# Patient Record
Sex: MALE | Race: BLACK OR AFRICAN AMERICAN | URBAN - METROPOLITAN AREA
[De-identification: names, ages, dates, MRNs, and addresses within clinical notes are randomized per-mention and may not be internally consistent; named-entity substitution may affect disease eponyms.]

---

## 2018-03-11 ENCOUNTER — HOSPITAL ENCOUNTER (EMERGENCY)
Facility: CLINIC | Age: 38
Discharge: HOME | End: 2018-03-11
Attending: PHYSICIAN ASSISTANT | Admitting: PHYSICIAN ASSISTANT

## 2018-03-11 VITALS
WEIGHT: 242 LBS | SYSTOLIC BLOOD PRESSURE: 147 MMHG | DIASTOLIC BLOOD PRESSURE: 86 MMHG | TEMPERATURE: 98.8 F | RESPIRATION RATE: 14 BRPM | OXYGEN SATURATION: 99 % | HEART RATE: 74 BPM

## 2018-03-11 DIAGNOSIS — K04.7 DENTAL INFECTION: ICD-10-CM

## 2018-03-11 DIAGNOSIS — R22.0 LEFT FACIAL SWELLING: ICD-10-CM

## 2018-03-11 DIAGNOSIS — R25.2 TRISMUS: ICD-10-CM

## 2018-03-11 DIAGNOSIS — R51.9 FACIAL PAIN, ACUTE: ICD-10-CM

## 2018-03-11 PROCEDURE — 99213 OFFICE O/P EST LOW 20 MIN: CPT | Performed by: PHYSICIAN ASSISTANT

## 2018-03-11 PROCEDURE — G0463 HOSPITAL OUTPT CLINIC VISIT: HCPCS

## 2018-03-11 RX ORDER — CLINDAMYCIN HCL 300 MG
300 CAPSULE ORAL 3 TIMES DAILY
Qty: 30 CAPSULE | Refills: 0 | Status: SHIPPED | OUTPATIENT
Start: 2018-03-11 | End: 2018-03-14

## 2018-03-11 NOTE — ED AVS SNAPSHOT
[facility] Emergency Department    [address] [address]    Phone:  [phone]    Fax:  [phone]                                       Erick Murillo   MRN: [id number]    Department:  [facility] Emergency Department   Date of Visit:  3/11/2018           Patient Information     Date Of Birth          1980        Your diagnoses for this visit were:     Dental infection     Left facial swelling     Facial pain, acute     Trismus        You were seen by Kelly Perez PA-C.      Follow-up Information     Follow up with [facility] Emergency Department.    Specialty:  EMERGENCY MEDICINE    Why:  As needed, If symptoms worsen    Contact information:    [address] [address] [phone]    Additional information:    The medical center is located at   [address]. (between [address] and   [address] in [place], [place] miles north   of [place]).        Discharge Instructions       Salt water gargles, warm compress to face.     Use medication as directed  See dentist in next 1-2 days for further evaluation and treatment.     Tylenol/ibuprofen over the counter as needed for pain.  Return to Emergency Room if fevers, change in voice, difficulty swallowing, shortness of breath, or worsening symptoms occur.     24 Hour Appointment Hotline       To make an appointment at any [facility] clinic, call [phone] ([phone]). If you don't have a family doctor or clinic, we will help you find one. [facility] clinics are conveniently located to serve the needs of you and your family.             Review of your medicines      START taking        Dose / Directions Last dose taken    clindamycin 300 MG capsule   Commonly known as:  CLEOCIN   Dose:  300 mg   Quantity:  30 capsule        Take 1 capsule (300 mg) by mouth 3 times daily for 10 days   Refills:  0                Prescriptions were sent or printed at these locations (1 Prescription)          "          [facility] [place], [place] - [address]   [address] [address]    Telephone:  [phone]   Fax:  [phone]   Hours:                  E-Prescribed (1 of 1)         clindamycin (CLEOCIN) 300 MG capsule                Orders Needing Specimen Collection     None      Pending Results     No orders found from 3/9/2018 to 3/12/2018.            Pending Culture Results     No orders found from 3/9/2018 to 3/12/2018.            Pending Results Instructions     If you had any lab results that were not finalized at the time of your Discharge, you can call the ED Lab Result RN at [phone]. You will be contacted by this team for any positive Lab results or changes in treatment. The nurses are available 7 days a week from 10A to 6:30P.  You can leave a message 24 hours per day and they will return your call.        Test Results From Your Hospital Stay               Thank you for choosing [facility]       Thank you for choosing [facility] for your care. Our goal is always to provide you with excellent care. Hearing back from our patients is one way we can continue to improve our services. Please take a few minutes to complete the written survey that you may receive in the mail after you visit with us. Thank you!        [facility] Information     [employer] lets you send messages to your doctor, view your test results, renew your prescriptions, schedule appointments and more. To sign up, go to [email or web address] . Click on \"Log in\" on the left side of the screen, which will take you to the Welcome page. Then click on \"Sign up Now\" on the right side of the page.     You will be asked to enter the access code listed below, as well as some personal information. Please follow the directions to create your username and password.     Your access code is: [email or web address]  Expires: 2018  8:22 PM     Your access code will  in 90 days. If you need help or a new code, please call your " [facility] or [phone].        Care EveryWhere ID     This is your Care EveryWhere ID. This could be used by other organizations to access your [facility] medical records  [id number]        Equal Access to Services     SHILOH RAMOS : Katt Betts, wajanida luqadaha, qaybta kaalmada rubens, yuliya tee. So [facility] [phone].    ATENCIÓN: Si habla español, tiene a sarah disposición servicios gratuitos de asistencia lingüística. Llame al [phone].    We comply with applicable federal civil rights laws and [place] laws. We do not discriminate on the basis of race, color, national origin, age, disability, sex, sexual orientation, or gender identity.            After Visit Summary       This is your record. Keep this with you and show to your community pharmacist(s) and doctor(s) at your next visit.

## 2018-03-11 NOTE — ED AVS SNAPSHOT
[facility] Emergency Department    [address] [address]    Phone:  [phone]    Fax:  [phone]                                       Erick Murillo   MRN: [id number]    Department:  [facility] Emergency Department   Date of Visit:  3/11/2018           After Visit Summary Signature Page     I have received my discharge instructions, and my questions have been answered. I have discussed any challenges I see with this plan with the nurse or doctor.    ..........................................................................................................................................  Patient/Patient Representative Signature      ..........................................................................................................................................  Patient Representative Print Name and Relationship to Patient    ..................................................               ................................................  Date                                            Time    ..........................................................................................................................................  Reviewed by Signature/Title    ...................................................              ..............................................  Date                                                            Time

## 2018-03-12 ASSESSMENT — ENCOUNTER SYMPTOMS
NAUSEA: 0
FACIAL SWELLING: 1
NECK PAIN: 0
CHILLS: 0
APPETITE CHANGE: 1
VOMITING: 0
DIARRHEA: 0
FEVER: 0
DIZZINESS: 0
TROUBLE SWALLOWING: 0
VOICE CHANGE: 0
SORE THROAT: 0
HEADACHES: 0
FATIGUE: 0
NECK STIFFNESS: 0

## 2018-03-12 NOTE — ED NOTES
Patient here for infected/abcessed tooth on the L lower side, symptoms started 4 days ago.  Patient presents ambulatory to the urgent care.

## 2018-03-12 NOTE — ED PROVIDER NOTES
History     Chief Complaint   Patient presents with     Dental Pain     abscessed tooth left lower      HPI  Erick Murillo is a 37 year old male who presents to the urgent care with dental pain and difficulty opening mouth that started 4 days ago. Patient states he has appointment with dentist on Tuesday 3/13/18. Patient denies fevers, chills, sore throat, dysphonia, dysphagia, swollen throat feeling. Patient denies headaches, sinus pain, no erythema to the face, chest pain, shortness of breath, nausea/vomiting. Patient states he has bad taste in mouth, left lower jaw pain, dental pain and gum swelling. Patient has taken advil over the counter for the pain with minimal relief.     Problem List:    There are no active problems to display for this patient.       Past Medical History:    History reviewed. No pertinent past medical history.    Past Surgical History:    History reviewed. No pertinent surgical history.    Family History:    No family history on file.    Social History:  Marital Status:  Single [1]  Social History   Substance Use Topics     Smoking status: Current Every Day Smoker     Packs/day: 0.50     Smokeless tobacco: Never Used     Alcohol use Not on file        Medications:      clindamycin (CLEOCIN) 300 MG capsule         Review of Systems   Constitutional: Positive for appetite change. Negative for chills, fatigue and fever.   HENT: Positive for dental problem and facial swelling. Negative for congestion, drooling, ear pain, hearing loss, sore throat, trouble swallowing and voice change.    Gastrointestinal: Negative for diarrhea, nausea and vomiting.   Musculoskeletal: Negative for neck pain and neck stiffness.   Skin: Negative for rash.   Neurological: Negative for dizziness and headaches.   All other systems reviewed and are negative.      Physical Exam   BP: 147/86  Pulse: 74  Temp: 98.8  F (37.1  C)  Resp: 14  Weight: 109.8 kg (242 lb)  SpO2: 99 %      Physical Exam   Constitutional: He is  oriented to person, place, and time. He appears well-developed and well-nourished. No distress.   HENT:   Head:       Right Ear: Tympanic membrane, external ear and ear canal normal.   Left Ear: Tympanic membrane, external ear and ear canal normal.   Nose: Nose normal. Right sinus exhibits no maxillary sinus tenderness and no frontal sinus tenderness. Left sinus exhibits no maxillary sinus tenderness and no frontal sinus tenderness.   Mouth/Throat: Uvula is midline. He does not have dentures. No oral lesions. There is trismus in the jaw. Dental caries present. No uvula swelling or lacerations.   Positive moderate swelling the tenderness to the left lower jaw. No erythema or warmth. Patient able to bite down, but can not open mouth completely due to pain and swelling in jaw. Patient has poor dentition throughout mouth and the oral mucosa of the left cheek appears slightly erythematous and macerated near the molars. No significant abscess or gum swelling noted to the gums of the teeth, but positive lower dental pain with palpation.    Eyes: Conjunctivae and EOM are normal. Pupils are equal, round, and reactive to light.   Neck: Normal range of motion. No tracheal deviation present.   Cardiovascular: Normal rate, regular rhythm and normal heart sounds.    Pulmonary/Chest: Effort normal and breath sounds normal. No stridor.   Lymphadenopathy:     He has cervical adenopathy.   Neurological: He is alert and oriented to person, place, and time.   Skin: Skin is warm and dry. No rash noted. He is not diaphoretic. No erythema.   Psychiatric: He has a normal mood and affect. His behavior is normal. Judgment and thought content normal.       ED Course     ED Course     Procedures              Critical Care time:  none               No results found for this or any previous visit (from the past 24 hour(s)).    Assessments & Plan (with Medical Decision Making)     I have reviewed the nursing notes.    I have reviewed the  findings, diagnosis, plan and need for follow up with the patient.   patient given clindamycin for dental infection that is likely causing masseter muscle spasm or infection causing the trismus. Discussed case with Dr. Calvin in Emergency Room and no further imaging is indicated at this time. Patient to use medication and see Dentist in next 1-2 days. Patient to return to Emergency Room if worsening trismus, fevers, throat swelling, change in voice, difficulty swallowing, or sore throat.     Discharge Medication List as of 3/11/2018  8:22 PM      START taking these medications    Details   clindamycin (CLEOCIN) 300 MG capsule Take 1 capsule (300 mg) by mouth 3 times daily for 10 days, Disp-30 capsule, R-0, E-Prescribe             Final diagnoses:   Dental infection   Left facial swelling   Facial pain, acute   Trismus       3/11/2018   [facility] EMERGENCY DEPARTMENT     Kelly Perez PA-C  03/12/18 2430

## 2018-03-12 NOTE — DISCHARGE INSTRUCTIONS
Salt water gargles, warm compress to face.     Use medication as directed  See dentist in next 1-2 days for further evaluation and treatment.     Tylenol/ibuprofen over the counter as needed for pain.  Return to Emergency Room if fevers, change in voice, difficulty swallowing, shortness of breath, or worsening symptoms occur.

## 2018-03-14 ENCOUNTER — APPOINTMENT (OUTPATIENT)
Dept: CT IMAGING | Facility: CLINIC | Age: 38
End: 2018-03-14
Attending: NURSE PRACTITIONER

## 2018-03-14 ENCOUNTER — HOSPITAL ENCOUNTER (EMERGENCY)
Facility: CLINIC | Age: 38
Discharge: HOME | End: 2018-03-14
Attending: NURSE PRACTITIONER | Admitting: NURSE PRACTITIONER

## 2018-03-14 VITALS
OXYGEN SATURATION: 98 % | RESPIRATION RATE: 14 BRPM | TEMPERATURE: 99.2 F | DIASTOLIC BLOOD PRESSURE: 82 MMHG | SYSTOLIC BLOOD PRESSURE: 147 MMHG

## 2018-03-14 DIAGNOSIS — M27.2 ODONTOGENIC INFECTION OF JAW: ICD-10-CM

## 2018-03-14 LAB
ANION GAP SERPL CALCULATED.3IONS-SCNC: 7 MMOL/L (ref 3–14)
BASOPHILS # BLD AUTO: 0 10E9/L (ref 0–0.2)
BASOPHILS NFR BLD AUTO: 0.2 %
BUN SERPL-MCNC: 19 MG/DL (ref 7–30)
CALCIUM SERPL-MCNC: 8.7 MG/DL (ref 8.5–10.1)
CHLORIDE SERPL-SCNC: 103 MMOL/L (ref 94–109)
CO2 SERPL-SCNC: 28 MMOL/L (ref 20–32)
CREAT SERPL-MCNC: 0.92 MG/DL (ref 0.66–1.25)
DIFFERENTIAL METHOD BLD: NORMAL
EOSINOPHIL # BLD AUTO: 0.1 10E9/L (ref 0–0.7)
EOSINOPHIL NFR BLD AUTO: 0.7 %
ERYTHROCYTE [DISTWIDTH] IN BLOOD BY AUTOMATED COUNT: 12.5 % (ref 10–15)
GFR SERPL CREATININE-BSD FRML MDRD: >90 ML/MIN/1.7M2
GLUCOSE SERPL-MCNC: 84 MG/DL (ref 70–99)
HCT VFR BLD AUTO: 41.1 % (ref 40–53)
HGB BLD-MCNC: 14.2 G/DL (ref 13.3–17.7)
IMM GRANULOCYTES # BLD: 0 10E9/L (ref 0–0.4)
IMM GRANULOCYTES NFR BLD: 0.3 %
LYMPHOCYTES # BLD AUTO: 2.7 10E9/L (ref 0.8–5.3)
LYMPHOCYTES NFR BLD AUTO: 25.7 %
MCH RBC QN AUTO: 30.7 PG (ref 26.5–33)
MCHC RBC AUTO-ENTMCNC: 34.5 G/DL (ref 31.5–36.5)
MCV RBC AUTO: 89 FL (ref 78–100)
MONOCYTES # BLD AUTO: 1.1 10E9/L (ref 0–1.3)
MONOCYTES NFR BLD AUTO: 10.4 %
NEUTROPHILS # BLD AUTO: 6.7 10E9/L (ref 1.6–8.3)
NEUTROPHILS NFR BLD AUTO: 62.7 %
PLATELET # BLD AUTO: 333 10E9/L (ref 150–450)
POTASSIUM SERPL-SCNC: 3.1 MMOL/L (ref 3.4–5.3)
RBC # BLD AUTO: 4.63 10E12/L (ref 4.4–5.9)
SODIUM SERPL-SCNC: 138 MMOL/L (ref 133–144)
WBC # BLD AUTO: 10.6 10E9/L (ref 4–11)

## 2018-03-14 PROCEDURE — 70487 CT MAXILLOFACIAL W/DYE: CPT

## 2018-03-14 PROCEDURE — 99214 OFFICE O/P EST MOD 30 MIN: CPT | Performed by: NURSE PRACTITIONER

## 2018-03-14 PROCEDURE — G0463 HOSPITAL OUTPT CLINIC VISIT: HCPCS

## 2018-03-14 PROCEDURE — 25000125 ZZHC RX 250: Performed by: NURSE PRACTITIONER

## 2018-03-14 PROCEDURE — 85025 COMPLETE CBC W/AUTO DIFF WBC: CPT | Performed by: NURSE PRACTITIONER

## 2018-03-14 PROCEDURE — 25000128 H RX IP 250 OP 636: Performed by: NURSE PRACTITIONER

## 2018-03-14 PROCEDURE — 80048 BASIC METABOLIC PNL TOTAL CA: CPT | Performed by: NURSE PRACTITIONER

## 2018-03-14 RX ORDER — IOPAMIDOL 755 MG/ML
80 INJECTION, SOLUTION INTRAVASCULAR ONCE
Status: COMPLETED | OUTPATIENT
Start: 2018-03-14 | End: 2018-03-14

## 2018-03-14 RX ORDER — HYDROCODONE BITARTRATE AND ACETAMINOPHEN 5; 325 MG/1; MG/1
1-2 TABLET ORAL EVERY 4 HOURS PRN
Qty: 15 TABLET | Refills: 0 | Status: SHIPPED | OUTPATIENT
Start: 2018-03-14

## 2018-03-14 RX ADMIN — IOPAMIDOL 80 ML: 755 INJECTION, SOLUTION INTRAVENOUS at 18:46

## 2018-03-14 RX ADMIN — SODIUM CHLORIDE 80 ML: 9 INJECTION, SOLUTION INTRAVENOUS at 18:46

## 2018-03-14 ASSESSMENT — ENCOUNTER SYMPTOMS
FEVER: 1
FACIAL SWELLING: 1
COUGH: 0
WEAKNESS: 0
ABDOMINAL PAIN: 0
CHILLS: 1

## 2018-03-14 NOTE — LETTER
[facility] EMERGENCY DEPARTMENT  [address] [facility] [facility] [address]  [phone]          March 14, 2018    RE:  Erick Murillo                                                                                                                                                       [address] [place] [address]            To whom it may concern:    Erick ZAIN Chidi was under my professional care today in Urgent Care. Please excuse him from work today and tomorrow for continued medical follow-up that he will need.      Sincerely,        CAESAR Mckeon CNP

## 2018-03-14 NOTE — ED AVS SNAPSHOT
[facility]    [address] [address]    Phone:  [phone]    Fax:  [phone]                                       Erick Murillo   MRN: [id number]    Department:  [facility]   Date of Visit:  3/14/2018           Patient Information     Date Of Birth          1980        Your diagnoses for this visit were:     Odontogenic infection of jaw        You were seen by Michelle Fulton APRN CNP.      Follow-up Information     Follow up with Dentist  In 1 day.        Discharge Instructions       Stop clindamycin.  Start Augmentin 875mg twice daily for 10 days.  Use ibuprofen 600 mg every 6-8 hours as needed for pain with food. Stop if it is causing nausea or abdominal pain.   Add Norco 5-325 (hydrocodone-acetaminophen) 1-2 pills up to every 4 hours if needed for pain. Do not use alcohol, operate machinery, drive, or climb on ladders for 8 hours after taking Norco. Use docusate (100mg) 2 times a day to prevent constipation while on narcotics.  Follow-up with dentist tomorrow.  Return to the emergency department immediately for any worsening--- fever, increased swelling, any redness, vomiting, or increased pain.      Many of these clinics offer a sliding fee option for patients that qualify, and see patients on a walk-in or same day basis. Please call each clinic directly. As services, hours, fees and policies vary greatly.          [facility], [facility]  [phone]  Sees no insurance  [facility], [place]  [phone]  Preventive services only  [facility] (mult loc) [phone]  [facility]    ([facility]), [facility]  [phone]  [facility] [place]       [phone]  Preventive services only  [facility]  [phone]  Accepts MA & sees no ins  [facility],      Accepts MA & sees no ins   [place]   [phone]; [phone]  [facility], [facility]   Accepts MA & sees no ins       [phone]  [facility], [facility]  [phone]   Accepts MA emergencies  [facility], [place] [phone]  [facility],     Accepts [facility]   [phone]    [facility] [phone]  Accepts MA & sees no ins   [facility]   Dental Clinic    [phone]  [facility], [facility]  [phone]   [facility] [phone]  [facility]  Preventive services only   [place]   [phone]  [facility]) [phone]  [facility]  [phone]  Same day [facility] [phone]  Same day [facility],      Same day [facility]   [phone]    [facility], [facility] [phone]  Free clinic, walk-in only  [facility] (multiple locations) [phone]      [facility] [phone]    [facility] [phone]  Free [facility], walk-in only  [facility]  [phone]  [facility] [phone] (adults)       [phone] (children)  [facility] [phone]    Also, referral service for low cost dental and healthcare: [phone]  And [phone]-Dentist       *DENTAL PAIN    A crack or cavity in the tooth, which exposes the sensitive inner area of the tooth can cause tooth pain. An infection in the gum or the root of the tooth can cause pain and swelling. The pain is often made worse by drinking hot or cold fluids, or biting on hard foods. Pain may spread from the tooth to the ear or jaw on the same side.  HOME CARE:  1. Avoid hot and cold foods and liquids since your tooth may be sensitive to temperature changes.  2. If your tooth is chipped or cracked, or if there is a large open cavity, apply OIL OF CLOVES (available  "over-the-counter in drug stores) directly to the tooth to reduce pain. Some pharmacies carry an over-the-counter \"toothache kit.\" This contains a paste, which can be applied over the exposed tooth to decrease sensitivity. This is only a temporary solution. See a dentist as soon as possible to fix the tooth.  3. A cold pack on your jaw over the sore area may help reduce pain.  4. You may use acetaminophen (Tylenol) 650-1000 mg every 6 hours or ibuprofen (Motrin, Advil) 600 mg every 6-8 hours with food to control pain, if you are able to take these medicines. [ NOTE: If you have chronic liver or kidney disease or ever had a stomach ulcer or GI bleeding, talk with your doctor before using these medicines.]  5. If you have signs of an infection, an antibiotic will be given. Take it as directed.  FOLLOW-UP as directed with a dentist. Your pain may go away with the treatment given. However, only a dentist can fully evaluate and treat the cause and prevent the pain from coming back again.  TOOTHACHE IS A SIGN OF DISEASE IN YOUR TOOTH AND SHOULD BE EXAMINED AND TREATED BY A DENTIST.  GET PROMPT MEDICAL ATTENTION if any of the following occur:    Your face becomes swollen or red    Pain worsens or spreads to the neck    Fever over 101  F (38.3  C)    Unusual drowsiness; headache or stiff neck; weakness or fainting    Pus drains from the tooth    Difficulty swallowing or breathing    2992-0891 [employer], [address] [place] [address]. All rights reserved. This information is not intended as a substitute for professional medical care. Always follow your healthcare professional's instructions.  Opioid Medication Information    Pain medications are among the most commonly prescribed medicines, so we are including this information for all our patients. If you did not receive pain medication or get a prescription for pain medicine, you can ignore it.     You may have been given a prescription for an opioid " (narcotic) pain medicine and/or have received a pain medicine while here in the Emergency Department. These medicines can make you drowsy or impaired. You must not drive, operate dangerous equipment, or engage in any other dangerous activities while taking these medications. If you drive while taking these medications, you could be arrested for DUI, or driving under the influence. Do not drink any alcohol while you are taking these medications.     Opioid pain medications can cause addiction. If you have a history of chemical dependency of any type, you are at a higher risk of becoming addicted to pain medications.  Only take these prescribed medications to treat your pain when all other options have been tried. Take it for as short a time and as few doses as possible. Store your pain pills in a secure place, as they are frequently stolen and provide a dangerous opportunity for children or visitors in your house to start abusing these powerful medications. We will not replace any lost or stolen medicine.  As soon as your pain is better, you should flush all your remaining medication.     Many prescription pain medications contain Tylenol  (acetaminophen), including Vicodin , Tylenol #3 , Norco , Lortab , and Percocet .  You should not take any extra pills of Tylenol  if you are using these prescription medications or you can get very sick.  Do not ever take more than 3000 mg of acetaminophen in any 24 hour period.    All opioids tend to cause constipation. Drink plenty of water and eat foods that have a lot of fiber, such as fruits, vegetables, prune juice, apple juice and high fiber cereal.  Take a laxative if you don t move your bowels at least every other day. Miralax , Milk of Magnesia, Colace , or Senna  can be used to keep you regular.      Remember that you can always come back to the Emergency Department if you are not able to see your regular doctor in the amount of time listed above, if you get any new  symptoms, or if there is anything that worries you.        24 Hour Appointment Hotline       To make an appointment at any [facility], call [phone] ([phone]). If you don't have a family doctor or clinic, we will help you find one. [facility] are conveniently located to serve the needs of you and your family.             Review of your medicines      START taking        Dose / Directions Last dose taken    amoxicillin-clavulanate 875-125 MG per tablet   Commonly known as:  AUGMENTIN   Dose:  1 tablet   Quantity:  20 tablet        Take 1 tablet by mouth 2 times daily   Refills:  0        HYDROcodone-acetaminophen 5-325 MG per tablet   Commonly known as:  NORCO   Dose:  1-2 tablet   Quantity:  15 tablet        Take 1-2 tablets by mouth every 4 hours as needed for moderate to severe pain   Refills:  0          STOP taking        Dose Reason for stopping Comments    clindamycin 300 MG capsule   Commonly known as:  CLEOCIN                      Prescriptions were sent or printed at these locations (2 Prescriptions)                   [facility] [place] [id number] [facility]   [address] [address]    Telephone:  [phone]   Fax:  [phone]   Hours:                  E-Prescribed (1 of 2)         amoxicillin-clavulanate (AUGMENTIN) 875-125 MG per tablet                 Printed at Department/Unit printer (1 of 2)         HYDROcodone-acetaminophen (NORCO) 5-325 MG per tablet                Procedures and tests performed during your visit     Basic metabolic panel    CBC with platelets differential    CT Maxillofacial w Contrast      Orders Needing Specimen Collection     None      Pending Results     Date and Time Order Name Status Description    3/14/2018 1832 CT Maxillofacial w Contrast Preliminary             Pending Culture Results     No orders found from 3/12/2018 to 3/15/2018.            Pending Results Instructions     If you had any lab results that were  not finalized at the time of your Discharge, you can call the ED Lab Result RN at [phone]. You will be contacted by this team for any positive Lab results or changes in treatment. The nurses are available 7 days a week from 10A to 6:30P.  You can leave a message 24 hours per day and they will return your call.        Test Results From Your Hospital Stay        3/14/2018  7:13 PM      Narrative     CT OF THE FACE WITH CONTRAST 3/14/2018 6:54 PM     COMPARISON: None    HISTORY: Left mandibular swelling, trismus. Evaluate for  abscess/infection.     TECHNIQUE:  Helical thin-section axial CT images of the face were  acquired after the administration of 80 mL Isovue 370 intravenous  contrast. Coronal reconstructions were created from the axial source  data.    FINDINGS: There is no evidence for mass, fluid collection,  inflammation or lymphadenopathy in the  space to explain the  patient's trismus.    There is extensive periodontal disease involving the maxillary and  mandibular teeth. There is inflammatory fat stranding of the left  cheek along the left jawline. There is an area of hypodensity in the  buccal soft tissues lateral to the mandibular body on the left that  could represent an area of phlegmonous change. No definite well formed  abscess identified.    The paranasal sinuses are well aerated. The orbits and globes  bilaterally are unremarkable.        Impression     IMPRESSION:  1. Probable inflammatory changes related to extensive dental disease  along the left side of the mandible with possible phlegmonous change  in the buccal soft tissues on the left as well as inflammatory fat  stranding in the left cheek along the left jawline.  2. No evidence for drainable well-formed abscess.    Radiation dose for this scan was reduced using automated exposure  control, adjustment of the mA and/or kV according to patient size, or  iterative reconstruction technique         3/14/2018  6:56 PM       Component Results     Component Value Ref Range & Units Status    WBC 10.6 4.0 - 11.0 10e9/L Final    RBC Count 4.63 4.4 - 5.9 10e12/L Final    Hemoglobin 14.2 13.3 - 17.7 g/dL Final    Hematocrit 41.1 40.0 - 53.0 % Final    MCV 89 78 - 100 fl Final    MCH 30.7 26.5 - 33.0 pg Final    MCHC 34.5 31.5 - 36.5 g/dL Final    RDW 12.5 10.0 - 15.0 % Final    Platelet Count 333 150 - 450 10e9/L Final    Diff Method Automated Method  Final    % Neutrophils 62.7 % Final    % Lymphocytes 25.7 % Final    % Monocytes 10.4 % Final    % Eosinophils 0.7 % Final    % Basophils 0.2 % Final    % Immature Granulocytes 0.3 % Final    Absolute Neutrophil 6.7 1.6 - 8.3 10e9/L Final    Absolute Lymphocytes 2.7 0.8 - 5.3 10e9/L Final    Absolute Monocytes 1.1 0.0 - 1.3 10e9/L Final    Absolute Eosinophils 0.1 0.0 - 0.7 10e9/L Final    Absolute Basophils 0.0 0.0 - 0.2 10e9/L Final    Abs Immature Granulocytes 0.0 0 - 0.4 10e9/L Final         3/14/2018  7:42 PM      Component Results     Component Value Ref Range & Units Status    Sodium 138 133 - 144 mmol/L Final    Potassium 3.1 (L) 3.4 - 5.3 mmol/L Final    Chloride 103 94 - 109 mmol/L Final    Carbon Dioxide 28 20 - 32 mmol/L Final    Anion Gap 7 3 - 14 mmol/L Final    Glucose 84 70 - 99 mg/dL Final    Urea Nitrogen 19 7 - 30 mg/dL Final    Creatinine 0.92 0.66 - 1.25 mg/dL Final    GFR Estimate >90 >60 mL/min/1.7m2 Final    Non  GFR Calc    GFR Estimate If Black >90 >60 mL/min/1.7m2 Final    African American GFR Calc    Calcium 8.7 8.5 - 10.1 mg/dL Final                Thank you for choosing [facility]       Thank you for choosing [facility] for your care. Our goal is always to provide you with excellent care. Hearing back from our patients is one way we can continue to improve our services. Please take a few minutes to complete the written survey that you may receive in the mail after you visit with us. Thank you!        [facility] Information     [employer] lets you send  "messages to your doctor, view your test results, renew your prescriptions, schedule appointments and more. To sign up, go to [email or web address] . Click on \"Log in\" on the left side of the screen, which will take you to the Welcome page. Then click on \"Sign up Now\" on the right side of the page.     You will be asked to enter the access code listed below, as well as some personal information. Please follow the directions to create your username and password.     Your access code is: [email or web address]  Expires: 2018  8:22 PM     Your access code will  in 90 days. If you need help or a new code, please call your [facility] clinic or [phone].        Care EveryWhere ID     This is your Care EveryWhere ID. This could be used by other organizations to access your [facility] medical records  [id number]        Equal Access to Services     SHILOH RAMOS : Hadii sarah Betts, waaxda lujanes, qaybta kaalmada adeabeba, yuliya escalona . So [facility] [phone].    ATENCIÓN: Si habla español, tiene a sarah disposición servicios gratuitos de asistencia lingüística. Llame al [phone].    We comply with applicable federal civil rights laws and [place] laws. We do not discriminate on the basis of race, color, national origin, age, disability, sex, sexual orientation, or gender identity.            After Visit Summary       This is your record. Keep this with you and show to your community pharmacist(s) and doctor(s) at your next visit.                  "

## 2018-03-14 NOTE — ED AVS SNAPSHOT
[facility] Emergency Department    [address] [address]    Phone:  [phone]    Fax:  [phone]                                       Erick Murillo   MRN: [id number]    Department:  [facility] Emergency Department   Date of Visit:  3/14/2018           After Visit Summary Signature Page     I have received my discharge instructions, and my questions have been answered. I have discussed any challenges I see with this plan with the nurse or doctor.    ..........................................................................................................................................  Patient/Patient Representative Signature      ..........................................................................................................................................  Patient Representative Print Name and Relationship to Patient    ..................................................               ................................................  Date                                            Time    ..........................................................................................................................................  Reviewed by Signature/Title    ...................................................              ..............................................  Date                                                            Time

## 2018-03-14 NOTE — ED PROVIDER NOTES
History     Chief Complaint   Patient presents with     Dental Pain     left lower, hurts in to jaw, on antibiotics     HPI  Erick Murillo is a 37 year old male who presents to urgent care for evaluation of worsening left lower jaw swelling and dental pain. Symptoms started 8 days ago. Accompanied by fever and chills. Evaluated here in Urgent Care for this on 3/11/18 and started on Clindamycin to treat an odontogenic infection and to follow-up with dentist as soon as possible. Patient reports he started the clindamycin on 3/11 (4 days ago) and has been unable to see a dentist. Patient returns to urgent care today for worsening symptoms. States he has been unable to eat or fully open his mouth. Patient is a current every day smoker. Denies nausea or vomiting.    Query of the [place] prescription monitoring program does not reveal any history of narcotic prescriptions.    Problem List:    There are no active problems to display for this patient.       Past Medical History:    History reviewed. No pertinent past medical history.    Past Surgical History:    History reviewed. No pertinent surgical history.    Family History:    No family history on file.    Social History:  Marital Status:  Single [1]  Social History   Substance Use Topics     Smoking status: Current Every Day Smoker     Packs/day: 0.50     Smokeless tobacco: Never Used     Alcohol use Not on file        Medications:      amoxicillin-clavulanate (AUGMENTIN) 875-125 MG per tablet   HYDROcodone-acetaminophen (NORCO) 5-325 MG per tablet       Review of Systems   Constitutional: Positive for chills and fever.   HENT: Positive for dental problem and facial swelling. Negative for congestion.    Respiratory: Negative for cough.    Cardiovascular: Negative for chest pain.   Gastrointestinal: Negative for abdominal pain.   Skin: Negative for rash.   Neurological: Negative for weakness.   All other systems reviewed and are negative.      Physical Exam   BP:  147/82  Heart Rate: 86  Temp: 99.2  F (37.3  C)  Resp: 14  SpO2: 98 %      Physical Exam   Constitutional: He is oriented to person, place, and time. He appears well-developed and well-nourished. No distress.   HENT:   Head: Normocephalic and atraumatic.       Right Ear: External ear normal.   Left Ear: External ear normal.   Nose: Nose normal.   Mouth/Throat: Uvula is midline and mucous membranes are normal. There is trismus in the jaw. Abnormal dentition. Dental caries present. No oropharyngeal exudate, posterior oropharyngeal edema or posterior oropharyngeal erythema.   Moderate swelling and tenderness to the left lower jaw. No erythema or warmth. Patient able to bite down minimally with pain. Able to open mouth 5cm.  Multiple dental carries throughout-- left lower posterior buccal region macerated.  No significant abscess or gum swelling noted to the gums of the teeth, but positive lower dental pain with palpation.     Eyes: Conjunctivae and EOM are normal.   Cardiovascular: Normal rate, regular rhythm and normal heart sounds.    Pulmonary/Chest: Effort normal and breath sounds normal.   Musculoskeletal: Normal range of motion.   Lymphadenopathy:     He has cervical adenopathy.   Neurological: He is alert and oriented to person, place, and time.   Skin: Skin is warm and dry.       ED Course     ED Course     Procedures               Results for orders placed or performed during the hospital encounter of 03/14/18 (from the past 24 hour(s))   CBC with platelets differential   Result Value Ref Range    WBC 10.6 4.0 - 11.0 10e9/L    RBC Count 4.63 4.4 - 5.9 10e12/L    Hemoglobin 14.2 13.3 - 17.7 g/dL    Hematocrit 41.1 40.0 - 53.0 %    MCV 89 78 - 100 fl    MCH 30.7 26.5 - 33.0 pg    MCHC 34.5 31.5 - 36.5 g/dL    RDW 12.5 10.0 - 15.0 %    Platelet Count 333 150 - 450 10e9/L    Diff Method Automated Method     % Neutrophils 62.7 %    % Lymphocytes 25.7 %    % Monocytes 10.4 %    % Eosinophils 0.7 %    % Basophils 0.2  %    % Immature Granulocytes 0.3 %    Absolute Neutrophil 6.7 1.6 - 8.3 10e9/L    Absolute Lymphocytes 2.7 0.8 - 5.3 10e9/L    Absolute Monocytes 1.1 0.0 - 1.3 10e9/L    Absolute Eosinophils 0.1 0.0 - 0.7 10e9/L    Absolute Basophils 0.0 0.0 - 0.2 10e9/L    Abs Immature Granulocytes 0.0 0 - 0.4 10e9/L   Basic metabolic panel   Result Value Ref Range    Sodium 138 133 - 144 mmol/L    Potassium 3.1 (L) 3.4 - 5.3 mmol/L    Chloride 103 94 - 109 mmol/L    Carbon Dioxide 28 20 - 32 mmol/L    Anion Gap 7 3 - 14 mmol/L    Glucose 84 70 - 99 mg/dL    Urea Nitrogen 19 7 - 30 mg/dL    Creatinine 0.92 0.66 - 1.25 mg/dL    GFR Estimate >90 >60 mL/min/1.7m2    GFR Estimate If Black >90 >60 mL/min/1.7m2    Calcium 8.7 8.5 - 10.1 mg/dL   CT Maxillofacial w Contrast    Narrative    CT OF THE FACE WITH CONTRAST 3/14/2018 6:54 PM     COMPARISON: None    HISTORY: Left mandibular swelling, trismus. Evaluate for  abscess/infection.     TECHNIQUE:  Helical thin-section axial CT images of the face were  acquired after the administration of 80 mL Isovue 370 intravenous  contrast. Coronal reconstructions were created from the axial source  data.    FINDINGS: There is no evidence for mass, fluid collection,  inflammation or lymphadenopathy in the  space to explain the  patient's trismus.    There is extensive periodontal disease involving the maxillary and  mandibular teeth. There is inflammatory fat stranding of the left  cheek along the left jawline. There is an area of hypodensity in the  buccal soft tissues lateral to the mandibular body on the left that  could represent an area of phlegmonous change. No definite well formed  abscess identified.    The paranasal sinuses are well aerated. The orbits and globes  bilaterally are unremarkable.      Impression    IMPRESSION:  1. Probable inflammatory changes related to extensive dental disease  along the left side of the mandible with possible phlegmonous change  in the buccal soft  tissues on the left as well as inflammatory fat  stranding in the left cheek along the left jawline.  2. No evidence for drainable well-formed abscess.    Radiation dose for this scan was reduced using automated exposure  control, adjustment of the mA and/or kV according to patient size, or  iterative reconstruction technique       Medications   iopamidol (ISOVUE-370) solution 80 mL (80 mLs Intravenous Given 3/14/18 1846)   sodium chloride 0.9 % bag 500mL for CT scan flush use (80 mLs As instructed Given 3/14/18 1846)       Assessments & Plan (with Medical Decision Making)   Erick Murillo is a 37 year old male who presents to urgent care for evaluation of worsening left lower jaw swelling and dental pain. Symptoms started 8 days ago. Accompanied by fever and chills. Evaluated here in Urgent Care for this on 3/11/18 and started on Clindamycin (300mg TID) to treat an odontogenic infection and to follow-up with dentist as soon as possible. Patient reports he started the clindamycin on 3/11 (4 days ago) and has been unable to see a dentist. Patient returns to urgent care today for worsening symptoms. States he has been unable to eat or fully open his mouth. Patient is a current every day smoker. Denies nausea or vomiting.    On exam patient is alert. Low grade fever of 99.2. Left lower mandibular swelling. Tenderness with palpation. No palpable abscess- and is firm upon palpation, no fluctuance. Posterior buccal membranes macerated. Significant tooth decay throughout. Able to bite down, but does have notable trismus.  Concern for worsening infection or underlying abscess. Discussed history and exam with Dr. Townsend, emergency provider. Obtained Maxillofacial CT which revealed extensive dental disease along the left side of the mandible with possible phlegmonous change in the buccal soft tissues as well as inflammatory fat stranding along the left jawline. No evidence for a drainable well-formed abscess. CBC is normal.  Potassium is low at 3.1, remainder of electrolytes are normal. I consulted the on-call dental physician at [facility], Dr. Clark. Given patient's lack of improvement on the clindamycin after 72 hours he will be changed to Augmentin- this will also cover both odontogenic and cellulitis.  Patient needs close follow-up with dental. I stressed this to the patient and instructed him to return to the emergency department immediately for any worsening -swelling, redness, pain, fever, vomiting.  Patient given prescription for Augmentin and Norco (#15).      I have reviewed the nursing notes.    I have reviewed the findings, diagnosis, plan and need for follow up with the patient.    New Prescriptions    AMOXICILLIN-CLAVULANATE (AUGMENTIN) 875-125 MG PER TABLET    Take 1 tablet by mouth 2 times daily    HYDROCODONE-ACETAMINOPHEN (NORCO) 5-325 MG PER TABLET    Take 1-2 tablets by mouth every 4 hours as needed for moderate to severe pain       Final diagnoses:   Odontogenic infection of jaw       3/14/2018   [facility] EMERGENCY DEPARTMENT     Michelle Fulton APRN CNP  03/14/18 2015

## 2018-03-15 ENCOUNTER — HOSPITAL ENCOUNTER (EMERGENCY)
Facility: CLINIC | Age: 38
Discharge: HOME | End: 2018-03-15
Attending: EMERGENCY MEDICINE | Admitting: EMERGENCY MEDICINE

## 2018-03-15 VITALS
RESPIRATION RATE: 14 BRPM | SYSTOLIC BLOOD PRESSURE: 136 MMHG | TEMPERATURE: 99.6 F | OXYGEN SATURATION: 98 % | DIASTOLIC BLOOD PRESSURE: 85 MMHG

## 2018-03-15 DIAGNOSIS — K02.9 TOOTH DECAY: ICD-10-CM

## 2018-03-15 DIAGNOSIS — K04.7 DENTAL INFECTION: ICD-10-CM

## 2018-03-15 PROCEDURE — 99284 EMERGENCY DEPT VISIT MOD MDM: CPT | Mod: Z6 | Performed by: EMERGENCY MEDICINE

## 2018-03-15 PROCEDURE — 99282 EMERGENCY DEPT VISIT SF MDM: CPT | Performed by: EMERGENCY MEDICINE

## 2018-03-15 NOTE — ED NOTES
Patient presents again today with ongoing c/o left jaw/dental pain and swelling.  Pt has been evaluated in this ED on 3/11  And 3/14 for same complaint and symptoms.  Pt rates current pain at 9/10 on pain scale.

## 2018-03-15 NOTE — DISCHARGE INSTRUCTIONS
Dental appointment arranged [facility]-3 PM today  Continue with Augmentin  [facility] to [facility] 30 minutes early.

## 2018-03-15 NOTE — ED AVS SNAPSHOT
[facility] Emergency Department    [address] [address]    Phone:  [phone]    Fax:  [phone]                                       Erick Murillo   MRN: [id number]    Department:  [facility] Emergency Department   Date of Visit:  3/15/2018           After Visit Summary Signature Page     I have received my discharge instructions, and my questions have been answered. I have discussed any challenges I see with this plan with the nurse or doctor.    ..........................................................................................................................................  Patient/Patient Representative Signature      ..........................................................................................................................................  Patient Representative Print Name and Relationship to Patient    ..................................................               ................................................  Date                                            Time    ..........................................................................................................................................  Reviewed by Signature/Title    ...................................................              ..............................................  Date                                                            Time

## 2018-03-15 NOTE — ED PROVIDER NOTES
History     Chief Complaint   Patient presents with     Oral Swelling     left jaw swelling, swelling increasing since visit here yesterday, pain, abcess tooth left lower     HPI  Ercik Murillo is a 37 year old male who presents for third visit to the ED at [facility] for ongoing left jaw swelling.  He was initially seen March 11 for tooth pain and some early left facial swelling.  Started on clindamycin.  Patient is traveling from [place].  Did not have a dentist to follow-up with.  Return to the emergency department last evening resulted in obtaining a facial CT soft tissues.  Report is contained within this document type.  There were findings of extensive dental disease/periodontal disease on the left with some inflammatory fat stranding and concerning for phlegmonous change.  No well organized abscess.  Contact with Dr. [id number] dentist at [facility] was completed.  Recommended switching from clindamycin to Augmentin.  Patient returns with ongoing swelling and pain.  States swelling is somewhat worse since last evening.  No change in swallowing or breathing.  Still having some trismus.  Still has not been able to locate a dentist.      Problem List:    There are no active problems to display for this patient.       Past Medical History:    No past medical history on file.    Past Surgical History:    No past surgical history on file.    Family History:    No family history on file.    Social History:  Marital Status:  Single [1]  Social History   Substance Use Topics     Smoking status: Current Every Day Smoker     Packs/day: 0.50     Smokeless tobacco: Never Used     Alcohol use Not on file        Medications:      amoxicillin-clavulanate (AUGMENTIN) 875-125 MG per tablet   HYDROcodone-acetaminophen (NORCO) 5-325 MG per tablet         Review of Systems  All pertinent positives and negatives are documented in the HPI.  All others reviewed and are negative .    Physical Exam   BP: 136/85  Heart Rate:  77  Temp: 99.6  F (37.6  C)  Resp: 14  SpO2: 98 %      Physical Exam  Vital signs reviewed  Left facial asymmetry due to soft tissue swelling noted on visual inspection.  Tenderness with palpation.  Soft tissues along the left mandible are firm but not fluctuant with no palpable abscess.  Buccal membranes are macerated with significant tooth decay.  Patient able to bite down.  Does have trismus.  No oropharyngeal or posterior pharyngeal swelling or exudate.  Tongue normal midline.  Uvula normal midline.  No palpable cervical adenopathy.  No nuchal rigidity.  Lungs are clear to auscultation with no stridor.  ED Course     ED Course     Procedures                 Results for orders placed or performed during the hospital encounter of 03/14/18 (from the past 24 hour(s))   CBC with platelets differential   Result Value Ref Range    WBC 10.6 4.0 - 11.0 10e9/L    RBC Count 4.63 4.4 - 5.9 10e12/L    Hemoglobin 14.2 13.3 - 17.7 g/dL    Hematocrit 41.1 40.0 - 53.0 %    MCV 89 78 - 100 fl    MCH 30.7 26.5 - 33.0 pg    MCHC 34.5 31.5 - 36.5 g/dL    RDW 12.5 10.0 - 15.0 %    Platelet Count 333 150 - 450 10e9/L    Diff Method Automated Method     % Neutrophils 62.7 %    % Lymphocytes 25.7 %    % Monocytes 10.4 %    % Eosinophils 0.7 %    % Basophils 0.2 %    % Immature Granulocytes 0.3 %    Absolute Neutrophil 6.7 1.6 - 8.3 10e9/L    Absolute Lymphocytes 2.7 0.8 - 5.3 10e9/L    Absolute Monocytes 1.1 0.0 - 1.3 10e9/L    Absolute Eosinophils 0.1 0.0 - 0.7 10e9/L    Absolute Basophils 0.0 0.0 - 0.2 10e9/L    Abs Immature Granulocytes 0.0 0 - 0.4 10e9/L   Basic metabolic panel   Result Value Ref Range    Sodium 138 133 - 144 mmol/L    Potassium 3.1 (L) 3.4 - 5.3 mmol/L    Chloride 103 94 - 109 mmol/L    Carbon Dioxide 28 20 - 32 mmol/L    Anion Gap 7 3 - 14 mmol/L    Glucose 84 70 - 99 mg/dL    Urea Nitrogen 19 7 - 30 mg/dL    Creatinine 0.92 0.66 - 1.25 mg/dL    GFR Estimate >90 >60 mL/min/1.7m2    GFR Estimate If Black >90 >60  mL/min/1.7m2    Calcium 8.7 8.5 - 10.1 mg/dL   CT Maxillofacial w Contrast    Narrative    CT OF THE FACE WITH CONTRAST 3/14/2018 6:54 PM     COMPARISON: None    HISTORY: Left mandibular swelling, trismus. Evaluate for  abscess/infection.     TECHNIQUE:  Helical thin-section axial CT images of the face were  acquired after the administration of 80 mL Isovue 370 intravenous  contrast. Coronal reconstructions were created from the axial source  data.    FINDINGS: There is no evidence for mass, fluid collection,  inflammation or lymphadenopathy in the  space to explain the  patient's trismus.    There is extensive periodontal disease involving the maxillary and  mandibular teeth. There is inflammatory fat stranding of the left  cheek along the left jawline. There is an area of hypodensity in the  buccal soft tissues lateral to the mandibular body on the left that  could represent an area of phlegmonous change. No definite well formed  abscess identified.    The paranasal sinuses are well aerated. The orbits and globes  bilaterally are unremarkable.      Impression    IMPRESSION:  1. Probable inflammatory changes related to extensive dental disease  along the left side of the mandible with possible phlegmonous change  in the buccal soft tissues on the left as well as inflammatory fat  stranding in the left cheek along the left jawline.  2. No evidence for drainable well-formed abscess.    Radiation dose for this scan was reduced using automated exposure  control, adjustment of the mA and/or kV according to patient size, or  iterative reconstruction technique    LICO LINN MD       Medications - No data to display    Assessments & Plan (with Medical Decision Making)  Patient returns for the third visit with ongoing odontogenic source for infection.  Referred to ER documentation from [facility] 3/11 visit in 3/14 visit.  CT shows extensive dental disease with early phlegmon along the buccal soft tissues on the left  but no organized abscess.  CT was completed last evening.  Has been on clindamycin for 4 days and yesterday switched to Augmentin.     Plan to contact [facility] dental [facility] to see if they can see patient today.  I would benefit from obtaining dental x-rays, consideration for total extractions.  9:51 AM  Contacted dental school at [facility].  They are willing to see the patient at 3:00 today.  Directions were provided.     I have reviewed the nursing notes.    I have reviewed the findings, diagnosis, plan and need for follow up with the patient.      New Prescriptions    No medications on file       Final diagnoses:   Tooth decay   Dental infection       3/15/2018   [facility] EMERGENCY DEPARTMENT     Konstantin Polanco,   03/15/18 0951

## 2018-03-15 NOTE — DISCHARGE INSTRUCTIONS
Stop clindamycin.  Start Augmentin 875mg twice daily for 10 days.  Use ibuprofen 600 mg every 6-8 hours as needed for pain with food. Stop if it is causing nausea or abdominal pain.   Add Norco 5-325 (hydrocodone-acetaminophen) 1-2 pills up to every 4 hours if needed for pain. Do not use alcohol, operate machinery, drive, or climb on ladders for 8 hours after taking Norco. Use docusate (100mg) 2 times a day to prevent constipation while on narcotics.  Follow-up with dentist tomorrow.  Return to the emergency department immediately for any worsening--- fever, increased swelling, any redness, vomiting, or increased pain.      Many of these clinics offer a sliding fee option for patients that qualify, and see patients on a walk-in or same day basis. Please call each clinic directly. As services, hours, fees and policies vary greatly.          [facility], [facility]  [phone]  Sees no insurance  [facility], [place]  [phone]  Preventive services only  [facility] (mult loc) [phone]  [facility]    ([facility]), [facility]  [phone]  [facility]       [phone]  Preventive services only  [facility]  [phone]  Accepts MA & sees no ins  [facility],      Accepts MA & sees no ins   [place]   [phone]; [phone]  [facility]   Accepts MA & sees no ins       [phone]  [facility]  [phone]   Accepts MA emergencies  [facility] [phone]  [facility],     Accepts MA   [place]   [phone]    [facility] [phone]  Accepts MA & sees no ins   [facility]   Dental Clinic    [phone]  [facility], [facility]  [phone]   [facility] [phone]  [facility]  Preventive services  "only   [place]   [phone]  [facility]) [phone]  [facility], [place]  [phone]  Same day [facility] [phone]  Same day [facility],      Same day [facility]   [phone]    [facility], [facility] [phone]  Free clinic, walk-in only  [facility] (multiple locations) [phone]      [facility] [phone]    [facility] [phone]  Free clinic, walk-in only  [facility]  [phone]  [facility] [phone] (adults)       [phone] (children)  [facility] [phone]    Also, referral service for low cost dental and healthcare: [phone]  And 2-187-Dentist       *DENTAL PAIN    A crack or cavity in the tooth, which exposes the sensitive inner area of the tooth can cause tooth pain. An infection in the gum or the root of the tooth can cause pain and swelling. The pain is often made worse by drinking hot or cold fluids, or biting on hard foods. Pain may spread from the tooth to the ear or jaw on the same side.  HOME CARE:  1. Avoid hot and cold foods and liquids since your tooth may be sensitive to temperature changes.  2. If your tooth is chipped or cracked, or if there is a large open cavity, apply OIL OF CLOVES (available over-the-counter in drug stores) directly to the tooth to reduce pain. Some pharmacies carry an over-the-counter \"toothache kit.\" This contains a paste, which can be applied over the exposed tooth to decrease sensitivity. This is only a temporary solution. See a dentist as soon as possible to fix the tooth.  3. A cold pack on your jaw over the sore area may help reduce pain.  4. You may use acetaminophen (Tylenol) 650-1000 mg every 6 hours or ibuprofen (Motrin, Advil) 600 mg every 6-8 hours with food to control pain, if you are able to take these " medicines. [ NOTE: If you have chronic liver or kidney disease or ever had a stomach ulcer or GI bleeding, talk with your doctor before using these medicines.]  5. If you have signs of an infection, an antibiotic will be given. Take it as directed.  FOLLOW-UP as directed with a dentist. Your pain may go away with the treatment given. However, only a dentist can fully evaluate and treat the cause and prevent the pain from coming back again.  TOOTHACHE IS A SIGN OF DISEASE IN YOUR TOOTH AND SHOULD BE EXAMINED AND TREATED BY A DENTIST.  GET PROMPT MEDICAL ATTENTION if any of the following occur:    Your face becomes swollen or red    Pain worsens or spreads to the neck    Fever over 101  F (38.3  C)    Unusual drowsiness; headache or stiff neck; weakness or fainting    Pus drains from the tooth    Difficulty swallowing or breathing    2439-3595 [employer], [address] [place] [address]141. All rights reserved. This information is not intended as a substitute for professional medical care. Always follow your healthcare professional's instructions.  Opioid Medication Information    Pain medications are among the most commonly prescribed medicines, so we are including this information for all our patients. If you did not receive pain medication or get a prescription for pain medicine, you can ignore it.     You may have been given a prescription for an opioid (narcotic) pain medicine and/or have received a pain medicine while here in the Emergency Department. These medicines can make you drowsy or impaired. You must not drive, operate dangerous equipment, or engage in any other dangerous activities while taking these medications. If you drive while taking these medications, you could be arrested for DUI, or driving under the influence. Do not drink any alcohol while you are taking these medications.     Opioid pain medications can cause addiction. If you have a history of chemical dependency of any  type, you are at a higher risk of becoming addicted to pain medications.  Only take these prescribed medications to treat your pain when all other options have been tried. Take it for as short a time and as few doses as possible. Store your pain pills in a secure place, as they are frequently stolen and provide a dangerous opportunity for children or visitors in your house to start abusing these powerful medications. We will not replace any lost or stolen medicine.  As soon as your pain is better, you should flush all your remaining medication.     Many prescription pain medications contain Tylenol  (acetaminophen), including Vicodin , Tylenol #3 , Norco , Lortab , and Percocet .  You should not take any extra pills of Tylenol  if you are using these prescription medications or you can get very sick.  Do not ever take more than 3000 mg of acetaminophen in any 24 hour period.    All opioids tend to cause constipation. Drink plenty of water and eat foods that have a lot of fiber, such as fruits, vegetables, prune juice, apple juice and high fiber cereal.  Take a laxative if you don t move your bowels at least every other day. Miralax , Milk of Magnesia, Colace , or Senna  can be used to keep you regular.      Remember that you can always come back to the Emergency Department if you are not able to see your regular doctor in the amount of time listed above, if you get any new symptoms, or if there is anything that worries you.

## 2018-03-15 NOTE — ED AVS SNAPSHOT
[facility] Emergency Department    [address] [address]    Phone:  [phone]    Fax:  [phone]                                       Erick Murillo   MRN: [id number]    Department:  [facility] Emergency Department   Date of Visit:  3/15/2018           Patient Information     Date Of Birth          1980        Your diagnoses for this visit were:     Tooth decay     Dental infection        You were seen by Konstantin Polanco DO.        Discharge Instructions       Dental appointment arranged [facility]-3 PM today  Continue with Augmentin  Try to arrive at the [facility] clinic 30 minutes early.    24 Hour Appointment Hotline       To make an appointment at any [facility], call [phone] ([phone]). If you don't have a family doctor or clinic, we will help you find one. [place] clinics are conveniently located to serve the needs of you and your family.             Review of your medicines      Our records show that you are taking the medicines listed below. If these are incorrect, please call your family doctor or clinic.        Dose / Directions Last dose taken    amoxicillin-clavulanate 875-125 MG per tablet   Commonly known as:  AUGMENTIN   Dose:  1 tablet   Quantity:  20 tablet        Take 1 tablet by mouth 2 times daily   Refills:  0        HYDROcodone-acetaminophen 5-325 MG per tablet   Commonly known as:  NORCO   Dose:  1-2 tablet   Quantity:  15 tablet        Take 1-2 tablets by mouth every 4 hours as needed for moderate to severe pain   Refills:  0                Orders Needing Specimen Collection     None      Pending Results     No orders found from 3/13/2018 to 3/16/2018.            Pending Culture Results     No orders found from 3/13/2018 to 3/16/2018.            Pending Results Instructions     If you had any lab results that were not finalized at the time of your Discharge, you can call the ED Lab Result RN at  "[phone]. You will be contacted by this team for any positive Lab results or changes in treatment. The nurses are available 7 days a week from 10A to 6:30P.  You can leave a message 24 hours per day and they will return your call.        Test Results From Your Hospital Stay               Thank you for choosing [facility]       Thank you for choosing [facility] for your care. Our goal is always to provide you with excellent care. Hearing back from our patients is one way we can continue to improve our services. Please take a few minutes to complete the written survey that you may receive in the mail after you visit with us. Thank you!        [employer] Information     [employer] lets you send messages to your doctor, view your test results, renew your prescriptions, schedule appointments and more. To sign up, go to [email or web address] . Click on \"Log in\" on the left side of the screen, which will take you to the Welcome page. Then click on \"Sign up Now\" on the right side of the page.     You will be asked to enter the access code listed below, as well as some personal information. Please follow the directions to create your username and password.     Your access code is: [email or web address]  Expires: 2018  8:22 PM     Your access code will  in 90 days. If you need help or a new code, please call your [facility] clinic or [phone].        Care EveryWhere ID     This is your Care EveryWhere ID. This could be used by other organizations to access your [facility] medical records  [id number]        Equal Access to Services     SHILOH RAMOS : Hadii sarah tinoco hadasho Soomaali, waaxda luqadaha, qaybta kaalmada adeegyada, yuliya escalona . So [facility] [phone].    ATENCIÓN: Si habla español, tiene a sarah disposición servicios gratuitos de asistencia lingüística. Llame al [phone].    We comply with applicable federal civil rights laws and [place] laws. We do not discriminate on the basis of race, " color, national origin, age, disability, sex, sexual orientation, or gender identity.            After Visit Summary       This is your record. Keep this with you and show to your community pharmacist(s) and doctor(s) at your next visit.